# Patient Record
Sex: FEMALE | Race: WHITE | NOT HISPANIC OR LATINO | Employment: FULL TIME | URBAN - METROPOLITAN AREA
[De-identification: names, ages, dates, MRNs, and addresses within clinical notes are randomized per-mention and may not be internally consistent; named-entity substitution may affect disease eponyms.]

---

## 2020-11-23 ENCOUNTER — LAB VISIT (OUTPATIENT)
Dept: PRIMARY CARE CLINIC | Facility: OTHER | Age: 35
End: 2020-11-23
Attending: INTERNAL MEDICINE
Payer: OTHER GOVERNMENT

## 2020-11-23 DIAGNOSIS — Z03.818 ENCNTR FOR OBS FOR SUSP EXPSR TO OTH BIOLG AGENTS RULED OUT: Primary | ICD-10-CM

## 2020-11-23 PROCEDURE — U0003 INFECTIOUS AGENT DETECTION BY NUCLEIC ACID (DNA OR RNA); SEVERE ACUTE RESPIRATORY SYNDROME CORONAVIRUS 2 (SARS-COV-2) (CORONAVIRUS DISEASE [COVID-19]), AMPLIFIED PROBE TECHNIQUE, MAKING USE OF HIGH THROUGHPUT TECHNOLOGIES AS DESCRIBED BY CMS-2020-01-R: HCPCS

## 2020-11-24 LAB — SARS-COV-2 RNA RESP QL NAA+PROBE: NOT DETECTED

## 2021-07-01 ENCOUNTER — PATIENT MESSAGE (OUTPATIENT)
Dept: ADMINISTRATIVE | Facility: OTHER | Age: 36
End: 2021-07-01

## 2024-12-10 DIAGNOSIS — M25.512 CHRONIC LEFT SHOULDER PAIN: Primary | ICD-10-CM

## 2024-12-10 DIAGNOSIS — G89.29 CHRONIC LEFT SHOULDER PAIN: Primary | ICD-10-CM

## 2024-12-11 ENCOUNTER — OFFICE VISIT (OUTPATIENT)
Dept: ORTHOPEDICS | Facility: CLINIC | Age: 39
End: 2024-12-11
Payer: COMMERCIAL

## 2024-12-11 DIAGNOSIS — M67.922 BICEPS TENDINOPATHY, LEFT: ICD-10-CM

## 2024-12-11 DIAGNOSIS — M75.112 INCOMPLETE ROTATOR CUFF TEAR OR RUPTURE OF LEFT SHOULDER, NOT SPECIFIED AS TRAUMATIC: Primary | ICD-10-CM

## 2024-12-11 DIAGNOSIS — M75.52 CHRONIC BURSITIS OF LEFT SHOULDER: ICD-10-CM

## 2024-12-11 PROCEDURE — 99999 PR PBB SHADOW E&M-EST. PATIENT-LVL II: CPT | Mod: PBBFAC,,, | Performed by: ORTHOPAEDIC SURGERY

## 2024-12-11 RX ORDER — TRIAMCINOLONE ACETONIDE 40 MG/ML
40 INJECTION, SUSPENSION INTRA-ARTICULAR; INTRAMUSCULAR
Status: COMPLETED | OUTPATIENT
Start: 2024-12-11 | End: 2024-12-11

## 2024-12-11 RX ORDER — LIDOCAINE HYDROCHLORIDE 10 MG/ML
4 INJECTION, SOLUTION EPIDURAL; INFILTRATION; INTRACAUDAL; PERINEURAL
Status: COMPLETED | OUTPATIENT
Start: 2024-12-11 | End: 2024-12-11

## 2024-12-11 RX ADMIN — LIDOCAINE HYDROCHLORIDE 40 MG: 10 INJECTION, SOLUTION EPIDURAL; INFILTRATION; INTRACAUDAL; PERINEURAL at 05:12

## 2024-12-11 RX ADMIN — TRIAMCINOLONE ACETONIDE 40 MG: 40 INJECTION, SUSPENSION INTRA-ARTICULAR; INTRAMUSCULAR at 05:12

## 2024-12-11 NOTE — PROGRESS NOTES
[facility]    12/11/2024  Jaylon Willams    Chief complaint:   Chief Complaint   Patient presents with    Left Shoulder - Pain     VAS Pain: Not reported today.     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  I last saw and evaluated her in April of this year.  At that time we obtained an MRI given recalcitrant pain and weakness.  It was found that she had a interstitial tear of her cuff in addition to some bursitis.  Was given a steroid injection and had excellent relief for the past 6 months.  Pain returned about a month ago.  She also completed the home exercise program.  She reports her pain is worse when lifting the arm.  Particularly worse when reaching away from the body.    Hand dominance: L  Neck pain: N  Prior trauma to head/neck: N  Prior shoulder trauma: N  Prior elbow trauma: N  Prior wrist/hand trauma: N    Duration of symptoms:  10 months  Prior injections:  Yes; See above  Bracing:  No  Home or outpatient therapy:  Yes see above    Hx/o CVA/TIAs: N  Personal history of demyelinating disease: N  Hx/o Vitamin deficiency and/or malnutrition: N  Metabolic abnormalities acquired/genetic: N  Hx/o chemotherapy: N  Hx/o radiation: N  EtOH abuse: N  Hx/o diabetes 1/2: N   Last A1c:  Not applicable   Distal neuropathy w/ S-W testing: Y/N  Tobacco use (incl Vape): N    ROS:  Patient does not report fevers, chills, nausea, vomiting, chest pain, shortness of breath, dizziness, abdominal pain, N/T/P beyond described in HPI.     Past Medical History:   Diagnosis Date    PCOS (polycystic ovarian syndrome)        Prior to Admission medications    Medication Sig Start Date End Date Taking? Authorizing Provider   drospirenone-ethinyl estradioL (LUBA) 3-0.02 mg per tablet Take 1 tablet by mouth once daily. 11/19/24   Emma Alvarez MD   EScitalopram oxalate (LEXAPRO) 10 MG tablet Take 10 mg by mouth  every morning.    Provider, Historical       Past Surgical History:   Procedure Laterality Date    ELBOW SURGERY         Social History     Socioeconomic History    Marital status:    Tobacco Use    Smoking status: Never    Smokeless tobacco: Never       Review of patient's allergies indicates:   Allergen Reactions    Hydrocodone Nausea And Vomiting         Physical Exam:    Patient is alert, well-appearing, and in no acute distress. Breathing comfortably. Extraocular muscles are intact. Pupils equal. Facial muscles symmetric. Voice with good intonation. No ptosis, anhidrosis, or miosis.     Scapular motion is symmetric with forward flexion/protraction, retraction, abduction. No crepitus, dyskinesia, or winging.     There was no neck pain or radicular or neuritic symptoms elicited with neck flexion-extension lateral rotation.    Forward elevation of both shoulders is 140 on the right and about 130 in the left.  Internal rotation is to L5-S1 on the left and L4-5 on the right.  This is painful anteriorly in the shoulder on the left side.  External rotation is about 50 in the left and 60 on the right.  This is passively correctable.    Cuff strength is 4+ out of 5 left supraspinatus.  Right is 5/5.  Pain in the left.  External rotation is 5/5 bilaterally.  Belly press 5/5.  Positive Speed's on the left.  No pain over the AC joint.  No pain with cross-body adduction.  No external impingement.    Imaging:  Imaging declined today.  Did review the previous MRI obtained in April of last year which does show a partial thickness interstitial tear of the superior rotator cuff inclusive of the supra and even the posterior superior cuff.  No full-thickness displaced tear.  Moderate subacromial bursitis.  Biceps tendon is located within the groove.  No subluxation.    Labs:    Last A1c:  Not applicable.    Studies:  None further.      Assessment:  1. Incomplete rotator cuff tear or rupture of left shoulder, not specified  as traumatic  triamcinolone acetonide injection 40 mg    LIDOcaine (PF) 10 mg/ml (1%) injection 40 mg      2. Biceps tendinopathy, left  triamcinolone acetonide injection 40 mg    LIDOcaine (PF) 10 mg/ml (1%) injection 40 mg      3. Chronic bursitis of left shoulder  triamcinolone acetonide injection 40 mg    LIDOcaine (PF) 10 mg/ml (1%) injection 40 mg              Plan:  Jaylon is a 39-year-old left-hand dominant female who presents for chronic left shoulder pain.  I last saw her in April of this year.  MRI at that time showed a partial-thickness cuff tear but nothing complete or full-thickness or retracted.  She responded very well to a steroid injection in the home exercise regimen.  This lasted about 6 months.  Her pain returned last month.  She has a little bit of pain limb that weakness to motion today.  Otherwise her motion is intact as is her cuff.  Given her excellent response to the previous injection I have recommended a repeat.  However, after this injection if this continues into the future I do think we need to get a new MRI.  She also has some findings of long head of biceps pathology.  For this alone an arthroscopic procedure may be beneficial in the future but we are going to hold off on that for now.  She wished to proceed with the injection today.  I reviewed the risks of infection, elevated blood sugar, nerve injury, and headaches.  See the note below.      Procedure note:  Left shoulder subacromial injection: Informed consent was obtained in the skin overlying the left shoulder posterolateral arthroscopic portal was prepped in sterile fashion.  Next a 5 cc solution consisting of 40 mg of Kenalog and 4 cc of 1% plain lidocaine was injected into the left shoulder subacromial space.  The patient tolerated the procedure well.  There were no complications.  She is aware it is going to take 2-3 days to take effect.    She is going to continue with the home exercise regimen.  I provided a new copy of  that today.  We are going to leave follow up open ended.  She is happy with this plan.  All questions were answered.  She knows how to reach out to me if there are any interval concerns.      Anthony Barraza Jr. MD  Hand and Upper Extremity Surgery  [facility]

## 2025-06-19 ENCOUNTER — OFFICE VISIT (OUTPATIENT)
Dept: PODIATRY | Facility: CLINIC | Age: 40
End: 2025-06-19
Payer: COMMERCIAL

## 2025-06-19 VITALS — BODY MASS INDEX: 35.45 KG/M2 | WEIGHT: 233.94 LBS | HEIGHT: 68 IN

## 2025-06-19 DIAGNOSIS — L60.9 DISEASE OF NAIL: Primary | ICD-10-CM

## 2025-06-19 PROCEDURE — 3008F BODY MASS INDEX DOCD: CPT | Mod: CPTII,S$GLB,, | Performed by: PODIATRIST

## 2025-06-19 PROCEDURE — 99999 PR PBB SHADOW E&M-EST. PATIENT-LVL III: CPT | Mod: PBBFAC,,, | Performed by: PODIATRIST

## 2025-06-19 PROCEDURE — 99203 OFFICE O/P NEW LOW 30 MIN: CPT | Mod: S$GLB,,, | Performed by: PODIATRIST

## 2025-06-19 PROCEDURE — 1159F MED LIST DOCD IN RCRD: CPT | Mod: CPTII,S$GLB,, | Performed by: PODIATRIST

## 2025-06-19 PROCEDURE — 87101 SKIN FUNGI CULTURE: CPT | Performed by: PODIATRIST

## 2025-06-19 PROCEDURE — 1160F RVW MEDS BY RX/DR IN RCRD: CPT | Mod: CPTII,S$GLB,, | Performed by: PODIATRIST

## 2025-06-19 RX ORDER — ESZOPICLONE 1 MG/1
1 TABLET, FILM COATED ORAL NIGHTLY PRN
COMMUNITY
Start: 2025-06-09

## 2025-06-19 RX ORDER — PROPRANOLOL HYDROCHLORIDE 10 MG/1
10 TABLET ORAL DAILY PRN
COMMUNITY

## 2025-06-19 NOTE — PROGRESS NOTES
"Subjective:     Patient ID: Jaylon Willams is a 39 y.o. female.    Chief Complaint: Nail Problem (C/o bruising of both great toenails, fungus on both great toenails, x 6 months, nail  at the base, 0 pain at the moment, mild discomfort sometimes, non-diabetic, wears tennis and socks, last seen PCP Dr. Alvarez on 10/03/2024)    Jaylon is a 39 y.o. female who presents to the clinic complaining of thick and discolored toenails on both feet. Jaylon is inquiring about treatment options. Patient denies pain with toenails, just sometimes there is discomfort. Patient has no other pedal complaints at this time.     Problem List[1]    Medication List with Changes/Refills   Current Medications    DROSPIRENONE-ETHINYL ESTRADIOL (LUBA) 3-0.02 MG PER TABLET    Take 1 tablet by mouth once daily.    ESCITALOPRAM OXALATE (LEXAPRO) 10 MG TABLET    Take 10 mg by mouth every morning.    ESZOPICLONE (LUNESTA) 1 MG TAB    Take 1 mg by mouth nightly as needed.    PROPRANOLOL (INDERAL) 10 MG TABLET    Take 10 mg by mouth daily as needed.       Review of patient's allergies indicates:   Allergen Reactions    Hydrocodone Nausea And Vomiting       Past Surgical History:   Procedure Laterality Date    ELBOW SURGERY         Family History   Problem Relation Name Age of Onset    Diabetes Father      Breast cancer Maternal Grandmother         Social History[2]    Vitals:    06/19/25 1317   Weight: 106.1 kg (233 lb 14.5 oz)   Height: 5' 8" (1.727 m)   PainSc: 0-No pain       Hemoglobin A1C   Date Value Ref Range Status   09/24/2020 5.4 4.8 - 5.6 % Final     Comment:              Prediabetes: 5.7 - 6.4           Diabetes: >6.4           Glycemic control for adults with diabetes: <7.0       Review of Systems   Constitutional:  Negative for chills and fever.   Respiratory:  Negative for shortness of breath.    Cardiovascular:  Negative for chest pain, palpitations, orthopnea, claudication and leg swelling.   Gastrointestinal:  Negative for " diarrhea, nausea and vomiting.   Musculoskeletal:  Negative for joint pain.   Skin:  Negative for rash.   Neurological:  Negative for dizziness, tingling, sensory change, focal weakness and weakness.   Psychiatric/Behavioral: Negative.           Objective:      PHYSICAL EXAM: Apperance: Alert and orient in no distress,well developed, and with good attention to grooming and body habits  LOWER EXTREMITY EXAM:  VASCULAR: Dorsalis pedis pulses 2/4 bilateral and Posterior Tibial pulses 2/4 bilateral.   DERMATOLOGICAL: No skin rashes, subcutaneous nodules, lesions, or ulcers observed bilateral. Nails 1 bilateral elongated, thickened, and discolored with subungual debris. Webspaces 1,2,3,4 bilateral clean, dry and without evidence of break in skin integrity.  NEUROLOGICAL: Light touch, sharp-dull, proprioception all present and equal bilaterally.     MUSCULOSKELETAL: Muscle strength is 5/5 for foot inverters, everters, plantarflexors, and dorsiflexors. Muscle tone is normal. Positive tenderness on palpation of nails bilateral hallux.         Assessment:       ICD-10-CM ICD-9-CM   1. Disease of nail  L60.9 703.9       Plan:   Disease of nail  -     Fungal culture , skin, hair, or nails      I counseled the patient on her conditions, regarding findings of my examination, my impressions, and usual treatment plan.   Patient instructed to spray all shoes with Lysol disinfectant spray and let dry before wearing. Patient instructed to wash all socks in hot water and bleach.  Discuss treatment options for nail fungus.  I explained that fungus lives in a warm dark moist environment and therefore patient should make every attempt to keep feet clean and dry.  We discussed drying feet thoroughly after shower particularly between the toes and then applying powder between the toes and in the shoes.    For fungal toenails I prescribed topical medication to be used daily for up to a year.  We also discussed oral Lamisil but I did not  recommend it as a first line of treatment since it is an internal medicine that may potentially have side effects, including liver problems.   With patient's permission, nail clippings obtained for fungal nail culture.   Patient to return pending nail culture results.              Chhaya Gomez DPM  [facility]          [1]   Patient Active Problem List  Diagnosis    Incomplete rotator cuff tear or rupture of left shoulder, not specified as traumatic    Biceps tendinopathy, left    Chronic bursitis of left shoulder   [2]   Social History  Socioeconomic History    Marital status:    Tobacco Use    Smoking status: Never    Smokeless tobacco: Never